# Patient Record
Sex: FEMALE | Race: WHITE | NOT HISPANIC OR LATINO | Employment: OTHER | URBAN - NONMETROPOLITAN AREA
[De-identification: names, ages, dates, MRNs, and addresses within clinical notes are randomized per-mention and may not be internally consistent; named-entity substitution may affect disease eponyms.]

---

## 2018-07-23 ENCOUNTER — TELEPHONE (OUTPATIENT)
Dept: GASTROENTEROLOGY | Facility: CLINIC | Age: 58
End: 2018-07-23

## 2018-07-23 ENCOUNTER — HOSPITAL ENCOUNTER (OUTPATIENT)
Dept: GENERAL RADIOLOGY | Facility: HOSPITAL | Age: 58
Discharge: HOME | End: 2018-07-23
Admitting: PHYSICIAN ASSISTANT

## 2018-07-23 ENCOUNTER — LAB (OUTPATIENT)
Dept: LAB | Facility: HOSPITAL | Age: 58
End: 2018-07-23

## 2018-07-23 ENCOUNTER — OFFICE VISIT (OUTPATIENT)
Dept: GASTROENTEROLOGY | Facility: CLINIC | Age: 58
End: 2018-07-23

## 2018-07-23 VITALS
HEART RATE: 72 BPM | HEIGHT: 62 IN | DIASTOLIC BLOOD PRESSURE: 100 MMHG | WEIGHT: 213.4 LBS | OXYGEN SATURATION: 98 % | SYSTOLIC BLOOD PRESSURE: 163 MMHG | BODY MASS INDEX: 39.27 KG/M2

## 2018-07-23 DIAGNOSIS — Z80.0 FAMILY HISTORY OF COLON CANCER: ICD-10-CM

## 2018-07-23 DIAGNOSIS — R19.5 LOOSE STOOLS: ICD-10-CM

## 2018-07-23 DIAGNOSIS — R19.00 ABDOMINAL SWELLING: ICD-10-CM

## 2018-07-23 DIAGNOSIS — R63.5 ABNORMAL WEIGHT GAIN: ICD-10-CM

## 2018-07-23 DIAGNOSIS — R12 HEARTBURN: Primary | ICD-10-CM

## 2018-07-23 LAB
ALBUMIN SERPL-MCNC: 4.5 G/DL (ref 3.5–5)
ALBUMIN/GLOB SERPL: 1.6 G/DL (ref 1.5–2.5)
ALP SERPL-CCNC: 90 U/L (ref 35–104)
ALT SERPL W P-5'-P-CCNC: 96 U/L (ref 10–36)
AMYLASE SERPL-CCNC: 59 U/L (ref 28–100)
ANION GAP SERPL CALCULATED.3IONS-SCNC: 5.6 MMOL/L (ref 3.6–11.2)
AST SERPL-CCNC: 48 U/L (ref 10–30)
BASOPHILS # BLD AUTO: 0.04 10*3/MM3 (ref 0–0.3)
BASOPHILS NFR BLD AUTO: 0.6 % (ref 0–2)
BILIRUB SERPL-MCNC: 0.8 MG/DL (ref 0.2–1.8)
BUN BLD-MCNC: 14 MG/DL (ref 7–21)
BUN/CREAT SERPL: 17.3 (ref 7–25)
CALCIUM SPEC-SCNC: 10.3 MG/DL (ref 7.7–10)
CHLORIDE SERPL-SCNC: 107 MMOL/L (ref 99–112)
CO2 SERPL-SCNC: 29.4 MMOL/L (ref 24.3–31.9)
CREAT BLD-MCNC: 0.81 MG/DL (ref 0.43–1.29)
CRP SERPL-MCNC: 1.75 MG/DL (ref 0–0.99)
DEPRECATED RDW RBC AUTO: 46.4 FL (ref 37–54)
EOSINOPHIL # BLD AUTO: 0.16 10*3/MM3 (ref 0–0.7)
EOSINOPHIL NFR BLD AUTO: 2.4 % (ref 0–5)
ERYTHROCYTE [DISTWIDTH] IN BLOOD BY AUTOMATED COUNT: 13.5 % (ref 11.5–14.5)
GFR SERPL CREATININE-BSD FRML MDRD: 72 ML/MIN/1.73
GLOBULIN UR ELPH-MCNC: 2.8 GM/DL
GLUCOSE BLD-MCNC: 112 MG/DL (ref 70–110)
HCT VFR BLD AUTO: 42.3 % (ref 37–47)
HGB BLD-MCNC: 13.8 G/DL (ref 12–16)
IMM GRANULOCYTES # BLD: 0.01 10*3/MM3 (ref 0–0.03)
IMM GRANULOCYTES NFR BLD: 0.1 % (ref 0–0.5)
LIPASE SERPL-CCNC: 76 U/L (ref 13–60)
LYMPHOCYTES # BLD AUTO: 2.02 10*3/MM3 (ref 1–3)
LYMPHOCYTES NFR BLD AUTO: 29.8 % (ref 21–51)
MCH RBC QN AUTO: 31.8 PG (ref 27–33)
MCHC RBC AUTO-ENTMCNC: 32.6 G/DL (ref 33–37)
MCV RBC AUTO: 97.5 FL (ref 80–94)
MONOCYTES # BLD AUTO: 0.72 10*3/MM3 (ref 0.1–0.9)
MONOCYTES NFR BLD AUTO: 10.6 % (ref 0–10)
NEUTROPHILS # BLD AUTO: 3.82 10*3/MM3 (ref 1.4–6.5)
NEUTROPHILS NFR BLD AUTO: 56.5 % (ref 30–70)
OSMOLALITY SERPL CALC.SUM OF ELEC: 284.3 MOSM/KG (ref 273–305)
PLATELET # BLD AUTO: 204 10*3/MM3 (ref 130–400)
PMV BLD AUTO: 11 FL (ref 6–10)
POTASSIUM BLD-SCNC: 4.3 MMOL/L (ref 3.5–5.3)
PROT SERPL-MCNC: 7.3 G/DL (ref 6–8)
RBC # BLD AUTO: 4.34 10*6/MM3 (ref 4.2–5.4)
SODIUM BLD-SCNC: 142 MMOL/L (ref 135–153)
T4 FREE SERPL-MCNC: 0.87 NG/DL (ref 0.89–1.76)
TSH SERPL DL<=0.05 MIU/L-ACNC: 3.96 MIU/ML (ref 0.55–4.78)
WBC NRBC COR # BLD: 6.77 10*3/MM3 (ref 4.5–12.5)

## 2018-07-23 PROCEDURE — 86140 C-REACTIVE PROTEIN: CPT

## 2018-07-23 PROCEDURE — 82150 ASSAY OF AMYLASE: CPT

## 2018-07-23 PROCEDURE — 83690 ASSAY OF LIPASE: CPT

## 2018-07-23 PROCEDURE — 74019 RADEX ABDOMEN 2 VIEWS: CPT

## 2018-07-23 PROCEDURE — 84439 ASSAY OF FREE THYROXINE: CPT

## 2018-07-23 PROCEDURE — 85025 COMPLETE CBC W/AUTO DIFF WBC: CPT

## 2018-07-23 PROCEDURE — 99204 OFFICE O/P NEW MOD 45 MIN: CPT | Performed by: PHYSICIAN ASSISTANT

## 2018-07-23 PROCEDURE — 36415 COLL VENOUS BLD VENIPUNCTURE: CPT

## 2018-07-23 PROCEDURE — 84443 ASSAY THYROID STIM HORMONE: CPT

## 2018-07-23 PROCEDURE — 80053 COMPREHEN METABOLIC PANEL: CPT

## 2018-07-23 PROCEDURE — 74019 RADEX ABDOMEN 2 VIEWS: CPT | Performed by: RADIOLOGY

## 2018-07-23 PROCEDURE — 86677 HELICOBACTER PYLORI ANTIBODY: CPT

## 2018-07-23 RX ORDER — IRBESARTAN 300 MG/1
300 TABLET ORAL NIGHTLY
COMMUNITY

## 2018-07-23 RX ORDER — PANTOPRAZOLE SODIUM 40 MG/1
40 TABLET, DELAYED RELEASE ORAL DAILY
Qty: 30 TABLET | Refills: 5 | Status: SHIPPED | OUTPATIENT
Start: 2018-07-23

## 2018-07-23 NOTE — PROGRESS NOTES
"Chief Complaint   Patient presents with   • Abdominal Pain   • Bloated   • Weight Gain     Madhu Lomeli is a 58 y.o. female who presents to the office today at the request of NUVIA Dior for evaluation of Abdominal Pain; Bloated; and Weight Gain.    HPI  She states that she has had GI complaints for 10-15 years and she has seen multiple providers without relief. Reports abdominal bloating and constant heartburn. She has also noticed weight gain. Had umbilical hernia in the past which was repaired and she was told that would help her swelling but it did not. If she sweeps the floor at home or picks up her grandson, the swelling in her abdomen will become very severe and she states that she looks \"9 months pregnant\" when it happens. Dr. Sanchez (she thinks, GI) told her that her bowels were \"pushed up against her liver and wrapped around her appendix.\" She states that these problems were \"fixed\" but she never had relief from her complaints. In the past, other providers have suspected that she is pregnant but testing was negative (not to her surprise). She was told by one provider that she \"was allergic to gluten\" but later was told by another that she was not. She stopped gluten for approx 6 months without relief of her complaints. Reports that she continues to gain weight although she eats small amounts. She has gained about 13 lbs in the past 3 months. Early satiety is present. Abdominal bloating is so severe that it makes it difficult for her to breathe. She drinks unsweet tea or decaf coffee and some pop. Diet is good in fiber per her report. She also takes daily fiber pills.     Heartburn is present every day and is severe. She is not currently taking any medications for it. She uses several medications over the counter such as tagamet which has not helped. BMs are described as occurring daily. She has up to 2 skip days between her stools at times. She does have straining at times and sometimes has " hard stools. Points to #6 on the [place] Stool Scale as her normal stool. No rectal bleeding or melena.     Her last colonoscopy was 5-6 years ago by Dr. Ga in [place] which was reported as normal. She has history of internal hemorrhoid removal. Her twin brother had colon cancer diagnosed at age 57 and sister also had colon cancer diagnosed at 53. She had EGD at the time of her colonoscopy which revealed gastric ulcers which were numerous per her report.     She recently had US thyroid and saw endocrinologist but he told her that she did not have a thyroid problem.     Review of Systems   Constitutional: Positive for fatigue and unexpected weight change. Negative for chills and fever.   HENT: Negative for congestion, sore throat and trouble swallowing.    Eyes: Positive for visual disturbance.   Respiratory: Positive for shortness of breath.    Cardiovascular: Positive for leg swelling. Negative for chest pain and palpitations.   Gastrointestinal: Positive for abdominal distention, abdominal pain and constipation. Negative for anal bleeding, blood in stool, diarrhea, nausea, rectal pain and vomiting.   Endocrine: Negative for cold intolerance and heat intolerance.   Genitourinary: Negative.    Musculoskeletal: Positive for arthralgias and myalgias. Negative for back pain.   Skin: Negative.    Allergic/Immunologic: Positive for environmental allergies and food allergies.   Neurological: Negative for dizziness and light-headedness.   Hematological: Does not bruise/bleed easily.   Psychiatric/Behavioral: Negative for sleep disturbance. The patient is nervous/anxious.      Past Medical History:   Diagnosis Date   • Hypertension    • Ulcer (CMS/HCC)      Past Surgical History:   Procedure Laterality Date   • APPENDECTOMY     • CHOLECYSTECTOMY     • COLONOSCOPY     • ENDOSCOPY     • HERNIA REPAIR     • OTHER SURGICAL HISTORY       Family History   Problem Relation Age of Onset   • Colon cancer Sister    • Colon  "cancer Brother    • Hypertension Other    • Heart disease Other      Social History   Substance Use Topics   • Smoking status: Never Smoker   • Smokeless tobacco: Never Used   • Alcohol use Yes       CURRENT MEDICATION:  •  irbesartan (AVAPRO) 300 MG tablet, Take 300 mg by mouth Every Night., Disp: , Rfl:     ALLERGIES:  Novocain [procaine] and Lactose intolerance (gi)    VISIT VITALS:  /100   Pulse 72   Ht 157.5 cm (62\")   Wt 96.8 kg (213 lb 6.4 oz)   SpO2 98%   BMI 39.03 kg/m²     Physical Exam   Constitutional: She is oriented to person, place, and time. She appears well-developed and well-nourished. No distress.   HENT:   Head: Normocephalic and atraumatic.   Nose: Nose normal.   Mouth/Throat: Oropharynx is clear and moist.   Eyes: Conjunctivae are normal. Right eye exhibits no discharge. Left eye exhibits no discharge. No scleral icterus.   Neck: Normal range of motion. No JVD present.   Cardiovascular: Normal rate, regular rhythm and normal heart sounds.  Exam reveals no gallop and no friction rub.    No murmur heard.  Pulmonary/Chest: Effort normal and breath sounds normal. No respiratory distress. She has no wheezes. She has no rales. She exhibits no tenderness.   Abdominal: Soft. Bowel sounds are normal. She exhibits distension. She exhibits no mass. There is tenderness (epigastric, mild).   Musculoskeletal: Normal range of motion. She exhibits no edema or deformity.   Neurological: She is alert and oriented to person, place, and time. Coordination normal.   Skin: Skin is warm and dry. No rash noted. She is not diaphoretic. No erythema.   Psychiatric: She has a normal mood and affect. Her behavior is normal. Judgment and thought content normal.   Vitals reviewed.      Assessment/Plan     1. Heartburn    2. Loose stools    3. Family history of colon cancer    4. Abdominal swelling    5. Abnormal weight gain       Orders Placed This Encounter   Procedures   • XR Abdomen Flat & Upright   • US " Abdomen Complete   • Amylase   • Comprehensive Metabolic Panel   • C-reactive Protein   • Lipase   • T4, Free   • TSH   • Helicobacter Pylori, IgA IgG IgM   • Follow Anesthesia Guidelines / Standing Orders   • CBC & Differential     ESOPHAGOGASTRODUODENOSCOPY WITH BIOPSY CPT CODE: 09738 (N/A), COLONOSCOPY CPT CODE: 92695 (N/A)  She will need an esophagogastroduodenoscopy and colonoscopy performed with IV general sedation. All of the risks, benefits and alternatives of these procedures have been discussed with her, all of her questions have been answered and she has elected to proceed. She should follow up in the office after these procedures to discuss the results and further recommendations can be made at that time.    New Medications Ordered This Visit   Medications   • polyethylene glycol (GoLYTELY) 236 g solution     Sig: Starting at 6pm the day before procedure, drink 8 ounces every 30 minutes until all gone or stools are clear. May add flavor packet.     Dispense:  4000 mL     Refill:  0   • pantoprazole (PROTONIX) 40 MG EC tablet     Sig: Take 1 tablet by mouth Daily.     Dispense:  30 tablet     Refill:  5     More recommendations will be made after aforementioned results have been reviewed. Consider SIBO treatment at next visit.    Patient's Body mass index is 39.03 kg/m². BMI is above normal parameters. Recommendations include: educational material.            Return for follow up after procedure to discuss results.      Electronically signed 7/23/2018 12:47 PM  Sheela Anand PA-C, [facility]

## 2018-07-24 ENCOUNTER — TELEPHONE (OUTPATIENT)
Dept: GASTROENTEROLOGY | Facility: CLINIC | Age: 58
End: 2018-07-24

## 2018-07-24 LAB
H PYLORI IGA SER IA-ACNC: 16.5 UNITS (ref 0–8.9)
H PYLORI IGG SER IA-ACNC: 0.47 INDEX VALUE (ref 0–0.79)
H PYLORI IGM SER-ACNC: <9 UNITS (ref 0–8.9)

## 2018-07-24 NOTE — TELEPHONE ENCOUNTER
Patient called and wanted to know results of her X-ray and labs she had done on 7-23-18.     Please and thank you

## 2018-07-25 NOTE — TELEPHONE ENCOUNTER
I faxed a cover sheet to PCP NUVIA Dior ([phone]) requesting a referral be sent to our office in regards to this patients abdominal pain and bloating.

## 2018-07-26 ENCOUNTER — HOSPITAL ENCOUNTER (OUTPATIENT)
Dept: ULTRASOUND IMAGING | Facility: HOSPITAL | Age: 58
Discharge: HOME | End: 2018-07-26
Admitting: PHYSICIAN ASSISTANT

## 2018-07-26 DIAGNOSIS — R19.00 ABDOMINAL SWELLING: ICD-10-CM

## 2018-07-26 DIAGNOSIS — R63.5 ABNORMAL WEIGHT GAIN: ICD-10-CM

## 2018-07-26 PROCEDURE — 76700 US EXAM ABDOM COMPLETE: CPT | Performed by: RADIOLOGY

## 2018-07-26 PROCEDURE — 76700 US EXAM ABDOM COMPLETE: CPT

## 2018-07-26 NOTE — TELEPHONE ENCOUNTER
Sheela, I was getting ready to call patient in-regards to her X-ray.Was her labs ok as well? I know she will ask.    Please and Thank you

## 2018-07-26 NOTE — TELEPHONE ENCOUNTER
Sorry, I looked over that. Her labs have some abnormal values- liver enzymes elevated, CRP (non-specific marker of inflammation) elevated, lipase mildly elevated (pancreas), her thyroid function is mildly decreased. US normal. Give her link to [employer] for future results if she would like. Thanks.

## 2018-07-27 PROBLEM — Z80.0 FAMILY HISTORY OF COLON CANCER: Status: ACTIVE | Noted: 2018-07-27

## 2018-07-27 PROBLEM — R19.5 LOOSE STOOLS: Status: ACTIVE | Noted: 2018-07-27

## 2018-07-27 PROBLEM — R12 HEARTBURN: Status: ACTIVE | Noted: 2018-07-27

## 2018-07-27 PROBLEM — R19.00 ABDOMINAL SWELLING: Status: ACTIVE | Noted: 2018-07-27

## 2018-08-07 ENCOUNTER — ANESTHESIA EVENT (OUTPATIENT)
Dept: PERIOP | Facility: HOSPITAL | Age: 58
End: 2018-08-07

## 2018-08-08 ENCOUNTER — ANESTHESIA (OUTPATIENT)
Dept: PERIOP | Facility: HOSPITAL | Age: 58
End: 2018-08-08

## 2018-08-08 ENCOUNTER — HOSPITAL ENCOUNTER (OUTPATIENT)
Facility: HOSPITAL | Age: 58
Setting detail: HOSPITAL OUTPATIENT SURGERY
Discharge: HOME | End: 2018-08-08
Attending: SURGERY | Admitting: SURGERY

## 2018-08-08 VITALS
SYSTOLIC BLOOD PRESSURE: 157 MMHG | RESPIRATION RATE: 20 BRPM | HEART RATE: 66 BPM | OXYGEN SATURATION: 96 % | DIASTOLIC BLOOD PRESSURE: 92 MMHG | TEMPERATURE: 98.5 F

## 2018-08-08 DIAGNOSIS — R12 HEARTBURN: ICD-10-CM

## 2018-08-08 DIAGNOSIS — R19.5 LOOSE STOOLS: ICD-10-CM

## 2018-08-08 DIAGNOSIS — Z80.0 FAMILY HISTORY OF COLON CANCER: ICD-10-CM

## 2018-08-08 DIAGNOSIS — R19.00 ABDOMINAL SWELLING: ICD-10-CM

## 2018-08-08 PROCEDURE — 25010000002 PROPOFOL 10 MG/ML EMULSION: Performed by: NURSE ANESTHETIST, CERTIFIED REGISTERED

## 2018-08-08 PROCEDURE — 43239 EGD BIOPSY SINGLE/MULTIPLE: CPT | Performed by: SURGERY

## 2018-08-08 PROCEDURE — 88305 TISSUE EXAM BY PATHOLOGIST: CPT | Performed by: SURGERY

## 2018-08-08 PROCEDURE — 25010000002 FENTANYL CITRATE (PF) 100 MCG/2ML SOLUTION: Performed by: NURSE ANESTHETIST, CERTIFIED REGISTERED

## 2018-08-08 PROCEDURE — 25010000002 PROPOFOL 1000 MG/ML EMULSION: Performed by: NURSE ANESTHETIST, CERTIFIED REGISTERED

## 2018-08-08 PROCEDURE — 45378 DIAGNOSTIC COLONOSCOPY: CPT | Performed by: SURGERY

## 2018-08-08 RX ORDER — LIDOCAINE HYDROCHLORIDE 20 MG/ML
INJECTION, SOLUTION INFILTRATION; PERINEURAL AS NEEDED
Status: DISCONTINUED | OUTPATIENT
Start: 2018-08-08 | End: 2018-08-08 | Stop reason: SURG

## 2018-08-08 RX ORDER — SODIUM CHLORIDE, SODIUM LACTATE, POTASSIUM CHLORIDE, CALCIUM CHLORIDE 600; 310; 30; 20 MG/100ML; MG/100ML; MG/100ML; MG/100ML
125 INJECTION, SOLUTION INTRAVENOUS CONTINUOUS
Status: DISCONTINUED | OUTPATIENT
Start: 2018-08-08 | End: 2018-08-08 | Stop reason: HOSPADM

## 2018-08-08 RX ORDER — SODIUM CHLORIDE 0.9 % (FLUSH) 0.9 %
1-10 SYRINGE (ML) INJECTION AS NEEDED
Status: DISCONTINUED | OUTPATIENT
Start: 2018-08-08 | End: 2018-08-08 | Stop reason: HOSPADM

## 2018-08-08 RX ORDER — IPRATROPIUM BROMIDE AND ALBUTEROL SULFATE 2.5; .5 MG/3ML; MG/3ML
3 SOLUTION RESPIRATORY (INHALATION) ONCE AS NEEDED
Status: DISCONTINUED | OUTPATIENT
Start: 2018-08-08 | End: 2018-08-08 | Stop reason: HOSPADM

## 2018-08-08 RX ORDER — FENTANYL CITRATE 50 UG/ML
50 INJECTION, SOLUTION INTRAMUSCULAR; INTRAVENOUS
Status: DISCONTINUED | OUTPATIENT
Start: 2018-08-08 | End: 2018-08-08 | Stop reason: HOSPADM

## 2018-08-08 RX ORDER — FENTANYL CITRATE 50 UG/ML
INJECTION, SOLUTION INTRAMUSCULAR; INTRAVENOUS AS NEEDED
Status: DISCONTINUED | OUTPATIENT
Start: 2018-08-08 | End: 2018-08-08 | Stop reason: SURG

## 2018-08-08 RX ORDER — ONDANSETRON 2 MG/ML
4 INJECTION INTRAMUSCULAR; INTRAVENOUS ONCE AS NEEDED
Status: DISCONTINUED | OUTPATIENT
Start: 2018-08-08 | End: 2018-08-08 | Stop reason: HOSPADM

## 2018-08-08 RX ORDER — PROPOFOL 10 MG/ML
VIAL (ML) INTRAVENOUS AS NEEDED
Status: DISCONTINUED | OUTPATIENT
Start: 2018-08-08 | End: 2018-08-08 | Stop reason: SURG

## 2018-08-08 RX ADMIN — SODIUM CHLORIDE, POTASSIUM CHLORIDE, SODIUM LACTATE AND CALCIUM CHLORIDE: 600; 310; 30; 20 INJECTION, SOLUTION INTRAVENOUS at 07:33

## 2018-08-08 RX ADMIN — PROPOFOL 150 MCG/KG/MIN: 10 INJECTION, EMULSION INTRAVENOUS at 07:36

## 2018-08-08 RX ADMIN — LIDOCAINE HYDROCHLORIDE 80 MG: 20 INJECTION, SOLUTION INFILTRATION; PERINEURAL at 07:36

## 2018-08-08 RX ADMIN — FENTANYL CITRATE 100 MCG: 50 INJECTION INTRAMUSCULAR; INTRAVENOUS at 07:33

## 2018-08-08 RX ADMIN — PROPOFOL 80 MG: 10 INJECTION, EMULSION INTRAVENOUS at 07:36

## 2018-08-08 NOTE — ANESTHESIA POSTPROCEDURE EVALUATION
Patient: Madhu Lomeli    Procedure Summary     Date:  08/08/18 Room / Location:  [facility] OR  /  [facility] OR    Anesthesia Start:  0733 Anesthesia Stop:  0756    Procedures:       ESOPHAGOGASTRODUODENOSCOPY WITH BIOPSY CPT CODE: 59742 (N/A Esophagus)      COLONOSCOPY CPT CODE: 63090 (N/A ) Diagnosis:       Heartburn      Family history of colon cancer      Loose stools      Abdominal swelling      (Heartburn [R12])      (Family history of colon cancer [Z80.0])      (Loose stools [R19.5])      (Abdominal swelling [R19.00])    Surgeon:  Fermin Singh MD Provider:  Frankie Subramanian MD    Anesthesia Type:  general ASA Status:  2          Anesthesia Type: general  Last vitals  BP   111/64 (08/08/18 0802)   Temp   98.5 °F (36.9 °C) (08/08/18 0757)   Pulse   73 (08/08/18 0802)   Resp   20 (08/08/18 0802)     SpO2   96 % (08/08/18 0802)     Post Anesthesia Care and Evaluation    Patient location during evaluation: PHASE II  Patient participation: complete - patient participated  Level of consciousness: awake and alert  Pain score: 1  Pain management: adequate  Airway patency: patent  Anesthetic complications: No anesthetic complications  PONV Status: controlled  Cardiovascular status: acceptable  Respiratory status: acceptable  Hydration status: acceptable

## 2018-08-08 NOTE — ANESTHESIA PREPROCEDURE EVALUATION
Anesthesia Evaluation     Patient summary reviewed and Nursing notes reviewed   no history of anesthetic complications:  NPO Solid Status: > 8 hours  NPO Liquid Status: > 8 hours           Airway   Mallampati: II  TM distance: >3 FB  Neck ROM: full  no difficulty expected  Dental - normal exam   (+) upper dentures    Pulmonary - negative pulmonary ROS and normal exam   (-) not a smoker  Cardiovascular - normal exam  Exercise tolerance: good (4-7 METS)    NYHA Classification: II    (+) hypertension,       Neuro/Psych- negative ROS  GI/Hepatic/Renal/Endo    (+) obesity,  GERD, PUD,      Musculoskeletal     Abdominal  - normal exam    Bowel sounds: normal.   Substance History - negative use     OB/GYN negative ob/gyn ROS         Other   (+) arthritis                   Anesthesia Plan    ASA 2     general     intravenous induction   Anesthetic plan and risks discussed with patient.  Use of blood products discussed with patient  Consented to blood products.

## 2018-08-08 NOTE — OP NOTE
ESOPHAGOGASTRODUODENOSCOPY, COLONOSCOPY  Procedure Note    Madhu Lomeli  8/8/2018    Pre-op Diagnosis:   Heartburn [R12]  Family history of colon cancer [Z80.0]  Loose stools [R19.5]  Abdominal swelling [R19.00]    Post-op Diagnosis:   Same with gastritis    Indications: see above    Procedure(s):  ESOPHAGOGASTRODUODENOSCOPY WITH BIOPSY CPT CODE: 39512  COLONOSCOPY CPT CODE: 48790    Surgeon(s):  Fermin Singh MD    Anesthesia: General    Staff:   Circulator: Radha Ayala RN  Endo Technician: Teofilo Escobedo    Findings: Gastritis, normal colon    Operative Procedure: The patient was taken operating suite and placed in left lateral decubitus position.  Bilateral sequential compression devices were in place and IV anesthesia was administered.  Timeout procedure was performed.  The endoscope was inserted into the oropharynx and the esophagus was intubated.  The scope was advanced to the third portion of the duodenum.  The scope was slowly withdrawn evaluating all mucosal areas.  The patient had mild to moderate gastritis without ulceration.  Biopsy forceps were used to sample the gastric antrum and gastric body.  Retroflexion was performed and there was no hiatal hernia or paraesophageal hernia.  The GE junction was within normal limits and the remainder the esophageal mucosa was within normal limits.  The scope was removed and colonoscopy was performed..  Digital rectal examination was within normal limits.  The colonoscope was inserted and advanced to the cecum as evidenced by the ileocecal valve and appendiceal orifice.  The prep was excellent.  Scope was slowly withdrawn evaluating mucosal surfaces.  No abnormalities were noted in the scope was removed and the patient was awakened from anesthesia and taken recovery.    Estimated Blood Loss: minimal    Specimens:   Gastric antrum                Drains: none    Grafts or Implants: none    Complications: none    Recommendations: colonoscopy in  10 years, continue PPI therapy    Fermin Singh MD     Date: 8/8/2018  Time: 8:01 AM

## 2018-08-08 NOTE — H&P (VIEW-ONLY)
"Chief Complaint   Patient presents with   • Abdominal Pain   • Bloated   • Weight Gain     Madhu Lomeli is a 58 y.o. female who presents to the office today at the request of NUVIA Dior for evaluation of Abdominal Pain; Bloated; and Weight Gain.    HPI  She states that she has had GI complaints for 10-15 years and she has seen multiple providers without relief. Reports abdominal bloating and constant heartburn. She has also noticed weight gain. Had umbilical hernia in the past which was repaired and she was told that would help her swelling but it did not. If she sweeps the floor at home or picks up her grandson, the swelling in her abdomen will become very severe and she states that she looks \"9 months pregnant\" when it happens. Dr. Sanchez (she thinks, GI) told her that her bowels were \"pushed up against her liver and wrapped around her appendix.\" She states that these problems were \"fixed\" but she never had relief from her complaints. In the past, other providers have suspected that she is pregnant but testing was negative (not to her surprise). She was told by one provider that she \"was allergic to gluten\" but later was told by another that she was not. She stopped gluten for approx 6 months without relief of her complaints. Reports that she continues to gain weight although she eats small amounts. She has gained about 13 lbs in the past 3 months. Early satiety is present. Abdominal bloating is so severe that it makes it difficult for her to breathe. She drinks unsweet tea or decaf coffee and some pop. Diet is good in fiber per her report. She also takes daily fiber pills.     Heartburn is present every day and is severe. She is not currently taking any medications for it. She uses several medications over the counter such as tagamet which has not helped. BMs are described as occurring daily. She has up to 2 skip days between her stools at times. She does have straining at times and sometimes has " hard stools. Points to #6 on the [place] Stool Scale as her normal stool. No rectal bleeding or melena.     Her last colonoscopy was 5-6 years ago by Dr. Ga in [place] which was reported as normal. She has history of internal hemorrhoid removal. Her twin brother had colon cancer diagnosed at age 57 and sister also had colon cancer diagnosed at 53. She had EGD at the time of her colonoscopy which revealed gastric ulcers which were numerous per her report.     She recently had US thyroid and saw endocrinologist but he told her that she did not have a thyroid problem.     Review of Systems   Constitutional: Positive for fatigue and unexpected weight change. Negative for chills and fever.   HENT: Negative for congestion, sore throat and trouble swallowing.    Eyes: Positive for visual disturbance.   Respiratory: Positive for shortness of breath.    Cardiovascular: Positive for leg swelling. Negative for chest pain and palpitations.   Gastrointestinal: Positive for abdominal distention, abdominal pain and constipation. Negative for anal bleeding, blood in stool, diarrhea, nausea, rectal pain and vomiting.   Endocrine: Negative for cold intolerance and heat intolerance.   Genitourinary: Negative.    Musculoskeletal: Positive for arthralgias and myalgias. Negative for back pain.   Skin: Negative.    Allergic/Immunologic: Positive for environmental allergies and food allergies.   Neurological: Negative for dizziness and light-headedness.   Hematological: Does not bruise/bleed easily.   Psychiatric/Behavioral: Negative for sleep disturbance. The patient is nervous/anxious.      Past Medical History:   Diagnosis Date   • Hypertension    • Ulcer (CMS/HCC)      Past Surgical History:   Procedure Laterality Date   • APPENDECTOMY     • CHOLECYSTECTOMY     • COLONOSCOPY     • ENDOSCOPY     • HERNIA REPAIR     • OTHER SURGICAL HISTORY       Family History   Problem Relation Age of Onset   • Colon cancer Sister    • Colon  "cancer Brother    • Hypertension Other    • Heart disease Other      Social History   Substance Use Topics   • Smoking status: Never Smoker   • Smokeless tobacco: Never Used   • Alcohol use Yes       CURRENT MEDICATION:  •  irbesartan (AVAPRO) 300 MG tablet, Take 300 mg by mouth Every Night., Disp: , Rfl:     ALLERGIES:  Novocain [procaine] and Lactose intolerance (gi)    VISIT VITALS:  /100   Pulse 72   Ht 157.5 cm (62\")   Wt 96.8 kg (213 lb 6.4 oz)   SpO2 98%   BMI 39.03 kg/m²     Physical Exam   Constitutional: She is oriented to person, place, and time. She appears well-developed and well-nourished. No distress.   HENT:   Head: Normocephalic and atraumatic.   Nose: Nose normal.   Mouth/Throat: Oropharynx is clear and moist.   Eyes: Conjunctivae are normal. Right eye exhibits no discharge. Left eye exhibits no discharge. No scleral icterus.   Neck: Normal range of motion. No JVD present.   Cardiovascular: Normal rate, regular rhythm and normal heart sounds.  Exam reveals no gallop and no friction rub.    No murmur heard.  Pulmonary/Chest: Effort normal and breath sounds normal. No respiratory distress. She has no wheezes. She has no rales. She exhibits no tenderness.   Abdominal: Soft. Bowel sounds are normal. She exhibits distension. She exhibits no mass. There is tenderness (epigastric, mild).   Musculoskeletal: Normal range of motion. She exhibits no edema or deformity.   Neurological: She is alert and oriented to person, place, and time. Coordination normal.   Skin: Skin is warm and dry. No rash noted. She is not diaphoretic. No erythema.   Psychiatric: She has a normal mood and affect. Her behavior is normal. Judgment and thought content normal.   Vitals reviewed.      Assessment/Plan     1. Heartburn    2. Loose stools    3. Family history of colon cancer    4. Abdominal swelling    5. Abnormal weight gain       Orders Placed This Encounter   Procedures   • XR Abdomen Flat & Upright   • US " Abdomen Complete   • Amylase   • Comprehensive Metabolic Panel   • C-reactive Protein   • Lipase   • T4, Free   • TSH   • Helicobacter Pylori, IgA IgG IgM   • Follow Anesthesia Guidelines / Standing Orders   • CBC & Differential     ESOPHAGOGASTRODUODENOSCOPY WITH BIOPSY CPT CODE: 17007 (N/A), COLONOSCOPY CPT CODE: 28692 (N/A)  She will need an esophagogastroduodenoscopy and colonoscopy performed with IV general sedation. All of the risks, benefits and alternatives of these procedures have been discussed with her, all of her questions have been answered and she has elected to proceed. She should follow up in the office after these procedures to discuss the results and further recommendations can be made at that time.    New Medications Ordered This Visit   Medications   • polyethylene glycol (GoLYTELY) 236 g solution     Sig: Starting at 6pm the day before procedure, drink 8 ounces every 30 minutes until all gone or stools are clear. May add flavor packet.     Dispense:  4000 mL     Refill:  0   • pantoprazole (PROTONIX) 40 MG EC tablet     Sig: Take 1 tablet by mouth Daily.     Dispense:  30 tablet     Refill:  5     More recommendations will be made after aforementioned results have been reviewed. Consider SIBO treatment at next visit.    Patient's Body mass index is 39.03 kg/m². BMI is above normal parameters. Recommendations include: educational material.            Return for follow up after procedure to discuss results.      Electronically signed 7/23/2018 12:47 PM  Sheela Anand PA-C, [facility]

## 2018-08-10 LAB
LAB AP CASE REPORT: NORMAL
PATH REPORT.FINAL DX SPEC: NORMAL

## 2018-08-23 ENCOUNTER — OFFICE VISIT (OUTPATIENT)
Dept: GASTROENTEROLOGY | Facility: CLINIC | Age: 58
End: 2018-08-23

## 2018-08-23 ENCOUNTER — LAB (OUTPATIENT)
Dept: LAB | Facility: HOSPITAL | Age: 58
End: 2018-08-23

## 2018-08-23 VITALS
BODY MASS INDEX: 38.9 KG/M2 | HEIGHT: 62 IN | OXYGEN SATURATION: 97 % | DIASTOLIC BLOOD PRESSURE: 87 MMHG | SYSTOLIC BLOOD PRESSURE: 132 MMHG | HEART RATE: 79 BPM | WEIGHT: 211.4 LBS

## 2018-08-23 DIAGNOSIS — R79.89 ELEVATED LIVER FUNCTION TESTS: ICD-10-CM

## 2018-08-23 DIAGNOSIS — K58.0 IRRITABLE BOWEL SYNDROME WITH DIARRHEA: ICD-10-CM

## 2018-08-23 DIAGNOSIS — R74.8 ABNORMAL LEVELS OF OTHER SERUM ENZYMES: ICD-10-CM

## 2018-08-23 DIAGNOSIS — R79.89 ELEVATED LIVER FUNCTION TESTS: Primary | ICD-10-CM

## 2018-08-23 DIAGNOSIS — R10.11 RIGHT UPPER QUADRANT ABDOMINAL PAIN: ICD-10-CM

## 2018-08-23 DIAGNOSIS — Z11.59 ENCOUNTER FOR SCREENING FOR OTHER VIRAL DISEASES (CODE): ICD-10-CM

## 2018-08-23 DIAGNOSIS — K63.89 SMALL INTESTINAL BACTERIAL OVERGROWTH: ICD-10-CM

## 2018-08-23 LAB
FERRITIN SERPL-MCNC: 170 NG/ML (ref 10–290.3)
GGT SERPL-CCNC: 48 U/L (ref 7–32)
HAV IGM SERPL QL IA: NORMAL
HBV CORE IGM SERPL QL IA: NORMAL
HBV SURFACE AB SER RIA-ACNC: NORMAL
HBV SURFACE AG SERPL QL IA: NORMAL
HCV AB SER DONR QL: NORMAL
IRON 24H UR-MRATE: 61 MCG/DL (ref 49–151)
IRON SATN MFR SERPL: 18 % (ref 15–50)
TIBC SERPL-MCNC: 333 MCG/DL (ref 241–421)

## 2018-08-23 PROCEDURE — 82977 ASSAY OF GGT: CPT

## 2018-08-23 PROCEDURE — 82728 ASSAY OF FERRITIN: CPT

## 2018-08-23 PROCEDURE — 36415 COLL VENOUS BLD VENIPUNCTURE: CPT

## 2018-08-23 PROCEDURE — 83516 IMMUNOASSAY NONANTIBODY: CPT

## 2018-08-23 PROCEDURE — 82104 ALPHA-1-ANTITRYPSIN PHENO: CPT

## 2018-08-23 PROCEDURE — 82103 ALPHA-1-ANTITRYPSIN TOTAL: CPT

## 2018-08-23 PROCEDURE — 86255 FLUORESCENT ANTIBODY SCREEN: CPT

## 2018-08-23 PROCEDURE — 99214 OFFICE O/P EST MOD 30 MIN: CPT | Performed by: PHYSICIAN ASSISTANT

## 2018-08-23 PROCEDURE — 82784 ASSAY IGA/IGD/IGG/IGM EACH: CPT

## 2018-08-23 PROCEDURE — 86706 HEP B SURFACE ANTIBODY: CPT

## 2018-08-23 PROCEDURE — 86038 ANTINUCLEAR ANTIBODIES: CPT

## 2018-08-23 PROCEDURE — 80074 ACUTE HEPATITIS PANEL: CPT

## 2018-08-23 PROCEDURE — 86708 HEPATITIS A ANTIBODY: CPT

## 2018-08-23 PROCEDURE — 82390 ASSAY OF CERULOPLASMIN: CPT

## 2018-08-23 PROCEDURE — 83550 IRON BINDING TEST: CPT

## 2018-08-23 PROCEDURE — 83540 ASSAY OF IRON: CPT

## 2018-08-23 RX ORDER — NITAZOXANIDE 500 MG/1
500 TABLET ORAL 2 TIMES DAILY WITH MEALS
Qty: 20 TABLET | Refills: 0 | Status: SHIPPED | OUTPATIENT
Start: 2018-08-23 | End: 2018-08-23

## 2018-08-23 NOTE — PROGRESS NOTES
"Chief Complaint   Patient presents with   • procedure follow up       Madhu Lomeli is a 58 y.o. female who presents to the office today for follow up appointment regarding recent procedure and abdominal bloating.    HPI  She states that her symptoms are still the same. She has never been told that she has elevated liver enzymes. She drinks alcohol about every other day. She has a couple of shots when she drinks and has been doing this for approx the past 1 year. She has been checked for hepatitis in the past which has been negative per her report. Last episode of very severe bloating was 2 days ago and was her only episode since colonoscopy. She states that she has had GI complaints for 10-15 years and she has seen multiple providers without relief. Reports abdominal bloating and constant heartburn. She has also noticed weight gain. Had umbilical hernia in the past which was repaired and she was told that would help her swelling but it did not. If she sweeps the floor at home or picks up her grandson, the swelling in her abdomen will become very severe and she states that she looks \"9 months pregnant\" when it happens. Dr. Sanchez (she thinks, GI) told her that her bowels were \"pushed up against her liver and wrapped around her appendix.\" She states that these problems were \"fixed\" but she never had relief from her complaints. In the past, other providers have suspected that she is pregnant but testing was negative (not to her surprise). She was told by one provider that she \"was allergic to gluten\" but later was told by another that she was not. She stopped gluten for approx 6 months without relief of her complaints. Reports that she continues to gain weight although she eats small amounts. She has gained about 13 lbs in the past 3 months. Early satiety is present. Abdominal bloating is so severe that it makes it difficult for her to breathe. She drinks unsweet tea or decaf coffee and some pop. Diet is good in " "fiber per her report. She also takes daily fiber pills. States that she does not recall every having her \"tubes tied.\"     Heartburn was previously present every day and was severe. Protonix 40 mg once daily has been taken since last office visit and does seem to be helping. She has used several medications over the counter such as tagamet which has not helped. BMs are described as occurring daily. She has up to 2 skip days between her stools at times. She does have straining at times and sometimes has hard stools. Points to #6 on the [place] Stool Scale as her normal stool. No rectal bleeding or melena.      Her previous colonoscopy was 5-6 years ago by Dr. Ga in [place] which was reported as normal. She has history of internal hemorrhoid removal. Her twin brother had colon cancer diagnosed at age 57 and sister also had colon cancer diagnosed at 53. She had EGD at the time of her colonoscopy which revealed gastric ulcers which were numerous per her report. She recently had US thyroid and saw endocrinologist but he told her that she did not have a thyroid problem. He did not take any new labs at that visit. She wonders if she can have \"thyroid antibodies\" tested for abnormality.     She had EGD and colonoscopy completed by Dr. Singh on 8/8/2018 which revealed moderate gastritis and normal colon. Pathology showed mild chronic gastritis and negative H pylori.     Abdominal film 7/23/2018:  FINDINGS:   Bowel gas pattern is nonobstructive. Minimal stool throughout the colon.  Cholecystectomy clips right upper quadrant. Bilateral tubal ligation  clips lower pelvis. No radiographic evidence of free air. The bony  structures appear within normal limits.   IMPRESSION:  No acute findings.    US abd 7/26/2018:  IMPRESSION:  Negative abdominal ultrasound.     Labs 7/23/2018:  Amylase normal  Lipase mildly elevated at 76  CRP elev at 1.75  TSH normal at 3.963  Free T4 decr at 0.87  H pylori negative except IgA  CBC " normal    Glucose 70 - 110 mg/dL 112     BUN 7 - 21 mg/dL 14    Creatinine 0.43 - 1.29 mg/dL 0.81    Sodium 135 - 153 mmol/L 142    Potassium 3.5 - 5.3 mmol/L 4.3    Chloride 99 - 112 mmol/L 107    CO2 24.3 - 31.9 mmol/L 29.4    Calcium 7.7 - 10.0 mg/dL 10.3     Total Protein 6.0 - 8.0 g/dL 7.3    Albumin 3.50 - 5.00 g/dL 4.50    ALT (SGPT) 10 - 36 U/L 96     AST (SGOT) 10 - 30 U/L 48     Alkaline Phosphatase 35 - 104 U/L 90    Comment: Note New Reference Ranges   Total Bilirubin 0.2 - 1.8 mg/dL 0.8    eGFR Non African Amer >60 mL/min/1.73 72    Globulin gm/dL 2.8    A/G Ratio 1.5 - 2.5 g/dL 1.6    BUN/Creatinine Ratio 7.0 - 25.0 17.3    Anion Gap 3.6 - 11.2 mmol/L 5.6      Review of Systems   Constitutional: Positive for fatigue.   HENT: Negative for trouble swallowing.    Eyes: Negative for photophobia and visual disturbance.   Respiratory: Negative for shortness of breath.    Cardiovascular: Negative for chest pain.   Gastrointestinal: Positive for abdominal distention and abdominal pain. Negative for anal bleeding, blood in stool, constipation, diarrhea, nausea, rectal pain and vomiting.   Endocrine: Negative for cold intolerance and heat intolerance.   Genitourinary: Negative.    Musculoskeletal: Positive for arthralgias, back pain and myalgias.   Skin: Negative for rash and wound.   Allergic/Immunologic: Positive for environmental allergies.   Neurological: Negative for dizziness and light-headedness.   Hematological: Does not bruise/bleed easily.   Psychiatric/Behavioral: The patient is nervous/anxious.      Past Medical History:   Diagnosis Date   • Arthritis    • GERD (gastroesophageal reflux disease)    • Hypertension    • Ulcer (CMS/HCC)      Past Surgical History:   Procedure Laterality Date   • APPENDECTOMY     • CHOLECYSTECTOMY     • COLONOSCOPY     • COLONOSCOPY N/A 8/8/2018    Procedure: COLONOSCOPY CPT CODE: 46547;  Surgeon: Fermin Singh MD;  Location: [facility];  Service: Gastroenterology  "  • ENDOSCOPY     • ENDOSCOPY N/A 8/8/2018    Procedure: ESOPHAGOGASTRODUODENOSCOPY WITH BIOPSY CPT CODE: 14998;  Surgeon: Fermin Singh MD;  Location: [facility];  Service: Gastroenterology   • HEMORRHOID BANDING     • HERNIA REPAIR     • OTHER SURGICAL HISTORY       Family History   Problem Relation Age of Onset   • Colon cancer Sister    • Colon cancer Brother    • Hypertension Other    • Heart disease Other      Social History   Substance Use Topics   • Smoking status: Never Smoker   • Smokeless tobacco: Never Used   • Alcohol use Yes      Comment: occasional       CURRENT MEDICATION:  •  irbesartan (AVAPRO) 300 MG tablet, Take 300 mg by mouth Every Night., Disp: , Rfl:   •  pantoprazole (PROTONIX) 40 MG EC tablet, Take 1 tablet by mouth Daily., Disp: 30 tablet, Rfl: 5    ALLERGIES:  Novocain [procaine] and Lactose intolerance (gi)    VISIT VITALS:  /87   Pulse 79   Ht 157.5 cm (62\")   Wt 95.9 kg (211 lb 6.4 oz)   SpO2 97%   BMI 38.67 kg/m²     Physical Exam   Constitutional: She is oriented to person, place, and time. She appears well-developed and well-nourished. No distress.   HENT:   Head: Normocephalic and atraumatic.   Nose: Nose normal.   Mouth/Throat: Oropharynx is clear and moist.   Eyes: Conjunctivae are normal. Right eye exhibits no discharge. Left eye exhibits no discharge. No scleral icterus.   Neck: Normal range of motion. No JVD present.   Cardiovascular: Normal rate, regular rhythm and normal heart sounds.  Exam reveals no gallop and no friction rub.    No murmur heard.  Pulmonary/Chest: Effort normal and breath sounds normal. No respiratory distress. She has no wheezes. She has no rales. She exhibits no tenderness.   Abdominal: Soft. Bowel sounds are normal. She exhibits distension. She exhibits no mass. There is tenderness (epigastric, mild).   Musculoskeletal: Normal range of motion. She exhibits no edema or deformity.   Neurological: She is alert and oriented to person, " place, and time. Coordination normal.   Skin: Skin is warm and dry. No rash noted. She is not diaphoretic. No erythema.   Psychiatric: She has a normal mood and affect. Her behavior is normal. Judgment and thought content normal.   Vitals reviewed.      Assessment/Plan     1. Elevated liver function tests    2. Abnormal levels of other serum enzymes     3. Encounter for screening for other viral diseases (CODE)     4. Right upper quadrant abdominal pain     5. Small intestinal bacterial overgrowth    6. Irritable bowel syndrome with diarrhea      Orders Placed This Encounter   Procedures   • Gamma GT   • Alpha - 1 - Antitrypsin Phenotype   • Anti-Smooth Muscle Antibody Titer   • Ceruloplasmin   • Celiac Comprehensive Panel   • Ferritin   • Hepatitis A Antibody, Total   • Hepatitis B Surface Antibody   • Hepatitis Panel, Acute   • IgG, IgA, IgM   • Iron Profile   • Mitochondrial Antibodies, M2   • Nuclear Antigen Antibody, IFA     New Medications Ordered This Visit   Medications   • rifaximin (XIFAXAN) 550 MG tablet     Sig: Take 1 tablet by mouth 3 (Three) Times a Day.     Dispense:  42 tablet     Refill:  0     Abnormal AST and ALT were noted on recent labs. She will be tested for possible etiologies including Alpha 1 antitrypsin deficiency, autoimmune hepatitis, primary biliary cirrhosis, Yovani's disease, hereditary hemochromatosis, celiac diease, acute hepatitis A, B and C. Immunity for Hep A and B will be checked as well and vaccinations will be recommended if needed.Ultrasound of abdomen already completed and normal. Abdominal film showed hepatomegaly on my review.     For non-specific GI complaints including severe bloating and fluctuation in bowel habits, I would like her to have therapy for suspected SIBO. First, I recommended Alinia but it was not covered by her insurance. We will attempt for pre-authorization of Xifaxan 550 mg TID for 14 days.             Return in about 1 month (around 9/23/2018) for  recheck abdominal pain.        Electronically signed 8/23/2018 4:48 PM  Sheela Anand PA-C, [facility]- [facility]

## 2018-08-24 ENCOUNTER — TELEPHONE (OUTPATIENT)
Dept: GASTROENTEROLOGY | Facility: CLINIC | Age: 58
End: 2018-08-24

## 2018-08-25 LAB
CERULOPLASMIN SERPL-MCNC: 29 MG/DL (ref 19–39)
DEPRECATED MITOCHONDRIA M2 IGG SER-ACNC: <20 UNITS (ref 0–20)
HAV AB SER QL IA: POSITIVE
IGA SERPL-MCNC: 204 MG/DL (ref 87–352)
IGG SERPL-MCNC: 851 MG/DL (ref 700–1600)
IGM SERPL-MCNC: 65 MG/DL (ref 26–217)

## 2018-08-27 LAB
ACTIN IGG SERPL-ACNC: 19 UNITS (ref 0–19)
ANA SER QL IA: NEGATIVE
ENDOMYSIUM IGA SER QL: NEGATIVE
GLIADIN PEPTIDE IGA SER-ACNC: 5 UNITS (ref 0–19)
GLIADIN PEPTIDE IGG SER-ACNC: 2 UNITS (ref 0–19)
IGA SERPL-MCNC: 202 MG/DL (ref 87–352)
TTG IGA SER-ACNC: <2 U/ML (ref 0–3)
TTG IGG SER-ACNC: <2 U/ML (ref 0–5)

## 2018-08-28 LAB
A1AT SERPL-MCNC: 141 MG/DL (ref 90–200)
PHENOTYPE: NORMAL

## 2018-08-31 ENCOUNTER — TELEPHONE (OUTPATIENT)
Dept: GASTROENTEROLOGY | Facility: CLINIC | Age: 58
End: 2018-08-31

## 2018-09-20 ENCOUNTER — OFFICE VISIT (OUTPATIENT)
Dept: GASTROENTEROLOGY | Facility: CLINIC | Age: 58
End: 2018-09-20

## 2018-09-20 VITALS
BODY MASS INDEX: 38.42 KG/M2 | HEIGHT: 62 IN | OXYGEN SATURATION: 97 % | WEIGHT: 208.8 LBS | HEART RATE: 68 BPM | SYSTOLIC BLOOD PRESSURE: 132 MMHG | DIASTOLIC BLOOD PRESSURE: 84 MMHG

## 2018-09-20 DIAGNOSIS — B37.31 VAGINAL YEAST INFECTION: ICD-10-CM

## 2018-09-20 DIAGNOSIS — K63.89 SMALL INTESTINAL BACTERIAL OVERGROWTH: Primary | ICD-10-CM

## 2018-09-20 DIAGNOSIS — K76.0 FATTY LIVER: ICD-10-CM

## 2018-09-20 DIAGNOSIS — Z23 NEED FOR IMMUNIZATION AGAINST VIRAL HEPATITIS: ICD-10-CM

## 2018-09-20 PROCEDURE — 99214 OFFICE O/P EST MOD 30 MIN: CPT | Performed by: PHYSICIAN ASSISTANT

## 2018-09-20 RX ORDER — NITAZOXANIDE 500 MG/1
500 TABLET ORAL 2 TIMES DAILY WITH MEALS
Qty: 20 TABLET | Refills: 0 | COMMUNITY
Start: 2018-09-20 | End: 2018-09-30

## 2018-09-20 RX ORDER — FLUCONAZOLE 150 MG/1
150 TABLET ORAL ONCE
Qty: 1 TABLET | Refills: 0 | Status: SHIPPED | OUTPATIENT
Start: 2018-09-20 | End: 2018-09-20

## 2018-09-20 NOTE — PROGRESS NOTES
"Chief Complaint   Patient presents with   • Bloated     1 month follow up        Madhu Lomeli is a 58 y.o. female who presents to the office today for follow up appointment regarding bloating.     HPI  Today, she states that there has only been mild improvement in bloating complaint. Since her last office visit, she took Xifaxan 550 mg TID for 14 days. She developed a yeast infection from this medication and reports itching which is very severe after completing. After the therapy, she noticed no improvement at all with stools or with bloating.  She states that she has no prior history of elevated cholesterol or DM2. Her brother had cirrhosis but was an alcoholic. She drinks alcohol about every other day. She has a couple of shots when she drinks and has been doing this for approx the past 1 year. She has been checked for hepatitis in the past which has been negative per her report.     She states that she has had GI complaints for 10-15 years and she has seen multiple providers without relief. Reports abdominal bloating and constant heartburn. She has also noticed weight gain. Had umbilical hernia in the past which was repaired and she was told that would help her swelling but it did not. If she sweeps the floor at home or picks up her grandson, the swelling in her abdomen will become very severe and she states that she looks \"9 months pregnant\" when it happens. Dr. Sanchez (she thinks, GI) told her that her bowels were \"pushed up against her liver and wrapped around her appendix.\" She states that these problems were \"fixed\" but she never had relief from her complaints. In the past, other providers have suspected that she is pregnant but testing was negative (not to her surprise). She was told by one provider that she \"was allergic to gluten\" but later was told by another that she was not. She stopped gluten for approx 6 months without relief of her complaints. Reports that she continues to gain weight although " "she eats small amounts. She has gained about 13 lbs in the past 3 months. Early satiety is present. Abdominal bloating is so severe that it makes it difficult for her to breathe. She drinks unsweet tea or decaf coffee and some pop. Diet is good in fiber per her report. She also takes daily fiber pills. States that she does not recall every having her \"tubes tied.\"    She had EGD and colonoscopy completed by Dr. Singh on 8/8/2018 which revealed moderate gastritis and normal colon. Pathology showed mild chronic gastritis and negative H pylori. Her previous colonoscopy was 5-6 years ago by Dr. Ga in [place] which was reported as normal. She has history of internal hemorrhoid removal. Her twin brother had colon cancer diagnosed at age 57 and sister also had colon cancer diagnosed at 53. She had EGD at the time of her colonoscopy which revealed gastric ulcers which were numerous per her report. She recently had US thyroid and saw endocrinologist but he told her that she did not have a thyroid problem. He did not take any new labs at that visit. She wonders if she can have \"thyroid antibodies\" tested for abnormality.     Abdominal film 7/23/2018 normal. (on my review: Hepatomegaly. Normal stool volume.)     US abd 7/26/2018 normal     Labs 7/23/2018:  Amylase normal  Lipase mildly elevated at 76  CRP elev at 1.75  TSH normal at 3.963  Free T4 decr at 0.87  H pylori negative except IgA  CBC normal  CMP elev AST 48, ALT 96, normal AP, normal Bili     Labs 8/23/2018:  Ferritin normal  Iron profile normal, borderline iron deficiency at 61  [id number] normal  Anti-smooth muscle antibody normal  Anti-nuclear antibody normal  Anti-mitochondrial antibody normal  Ceruloplasmin normal  Celiac panel negative  Hep B surf Ab negative  Hep A total Ab positive  Acute hepatitis panel negative  IgG, IgA, IgM normal  PT/INR normal  GGT elev 48    Review of Systems   Constitutional: Negative for fatigue.   HENT: Negative " for trouble swallowing.    Eyes: Positive for redness.   Respiratory: Negative.  Negative for choking, chest tightness and shortness of breath.    Cardiovascular: Negative.    Gastrointestinal: Positive for abdominal pain and constipation. Negative for diarrhea, nausea and vomiting.   Endocrine: Negative for heat intolerance.   Genitourinary: Negative for frequency.   Musculoskeletal: Negative for back pain.   Skin: Negative for color change and rash.   Allergic/Immunologic: Negative for food allergies.   Neurological: Positive for headaches.   Hematological: Does not bruise/bleed easily.   Psychiatric/Behavioral: The patient is not nervous/anxious.        Past Medical History:   Diagnosis Date   • Arthritis    • GERD (gastroesophageal reflux disease)    • Hypertension    • Ulcer (CMS/HCC)      Past Surgical History:   Procedure Laterality Date   • APPENDECTOMY     • CHOLECYSTECTOMY     • COLONOSCOPY     • COLONOSCOPY N/A 8/8/2018    Procedure: COLONOSCOPY CPT CODE: 09387;  Surgeon: Fermin Singh MD;  Location: [facility];  Service: Gastroenterology   • ENDOSCOPY     • ENDOSCOPY N/A 8/8/2018    Procedure: ESOPHAGOGASTRODUODENOSCOPY WITH BIOPSY CPT CODE: 68498;  Surgeon: Fermin Singh MD;  Location: [facility];  Service: Gastroenterology   • HEMORRHOID BANDING     • HERNIA REPAIR     • OTHER SURGICAL HISTORY       Family History   Problem Relation Age of Onset   • Colon cancer Sister    • Colon cancer Brother    • Hypertension Other    • Heart disease Other      Social History   Substance Use Topics   • Smoking status: Never Smoker   • Smokeless tobacco: Never Used   • Alcohol use Yes      Comment: occasional       CURRENT MEDICATION:  •  irbesartan (AVAPRO) 300 MG tablet, Take 300 mg by mouth Every Night., Disp: , Rfl:   •  pantoprazole (PROTONIX) 40 MG EC tablet, Take 1 tablet by mouth Daily., Disp: 30 tablet, Rfl: 5    ALLERGIES:  Novocain [procaine] and Lactose intolerance (gi)    VISIT VITALS:  BP  "132/84 (BP Location: Right arm, Patient Position: Sitting, Cuff Size: Adult)   Pulse 68   Ht 157.5 cm (62\")   Wt 94.7 kg (208 lb 12.8 oz)   SpO2 97%   BMI 38.19 kg/m²     Physical Exam   Constitutional: She is oriented to person, place, and time. She appears well-developed and well-nourished. No distress.   HENT:   Head: Normocephalic and atraumatic.   Nose: Nose normal.   Mouth/Throat: Oropharynx is clear and moist.   Eyes: Conjunctivae are normal. Right eye exhibits no discharge. Left eye exhibits no discharge. No scleral icterus.   Neck: Normal range of motion. No JVD present.   Cardiovascular: Normal rate, regular rhythm and normal heart sounds.  Exam reveals no gallop and no friction rub.    No murmur heard.  Pulmonary/Chest: Effort normal and breath sounds normal. No respiratory distress. She has no wheezes. She has no rales. She exhibits no tenderness.   Abdominal: Soft. Bowel sounds are normal. She exhibits distension. She exhibits no mass. There is tenderness (epigastric, mild).   Musculoskeletal: Normal range of motion. She exhibits no edema or deformity.   Neurological: She is alert and oriented to person, place, and time. Coordination normal.   Skin: Skin is warm and dry. No rash noted. She is not diaphoretic. No erythema.   Psychiatric: She has a normal mood and affect. Her behavior is normal. Judgment and thought content normal.   Vitals reviewed.      Assessment/Plan     1. Small intestinal bacterial overgrowth    2. Vaginal yeast infection    3. Fatty liver    4. Need for immunization against viral hepatitis      Orders Placed This Encounter   Procedures   • Hepatitis B Vaccine Adult IM     New Medications Ordered This Visit   Medications   • fluconazole (DIFLUCAN) 150 MG tablet     Sig: Take 1 tablet by mouth 1 (One) Time for 1 dose.     Dispense:  1 tablet     Refill:  0   • nitazoxanide (ALINIA) 500 MG tablet     Sig: Take 1 tablet by mouth 2 (Two) Times a Day With Meals for 10 days.     " Dispense:  20 tablet     Refill:  0     Order Specific Question:   Quantity     Answer:   10     Due to suspected SIBO and no improvement with Xifaxan, I have asked her to take Alinia as directed and samples were given.    Liver enzymes are abnormal. She had complete serological liver evaluation plus US abdomen. I believe that fatty liver is the cause of her abnormal labs and I have asked her to work on getting to a normal weight for her height. Series of immunizations for Hepatitis B should be obtained and have been ordered today. She is already immune to Hep A. Immunity is important to prevent further insult to her liver. Information regarding locations that these can be performed has been expressed.    Diflucan will be taken for treatment of vaginal yeast infection related to previous medication.         Return in about 1 month (around 10/20/2018) for recheck bloating.      Electronically signed 9/26/2018 4:30 PM  Sheela Anand PA-C, [facility]- [facility]

## 2018-12-03 ENCOUNTER — OFFICE VISIT (OUTPATIENT)
Dept: GASTROENTEROLOGY | Facility: CLINIC | Age: 58
End: 2018-12-03

## 2018-12-03 VITALS
BODY MASS INDEX: 39.01 KG/M2 | OXYGEN SATURATION: 96 % | DIASTOLIC BLOOD PRESSURE: 87 MMHG | SYSTOLIC BLOOD PRESSURE: 132 MMHG | WEIGHT: 212 LBS | HEIGHT: 62 IN | HEART RATE: 72 BPM

## 2018-12-03 DIAGNOSIS — R12 HEARTBURN: ICD-10-CM

## 2018-12-03 DIAGNOSIS — R14.0 BLOATING: Primary | ICD-10-CM

## 2018-12-03 PROCEDURE — 99213 OFFICE O/P EST LOW 20 MIN: CPT | Performed by: PHYSICIAN ASSISTANT

## 2018-12-03 NOTE — PROGRESS NOTES
: 1960    Chief Complaint   Patient presents with   • Small intestinal bacterial overgrowth       Madhu Lomeli is a 58 y.o. female who presents to the office today as a follow up appointment regarding Small intestinal bacterial overgrowth.    History of Present Illness:  She is still having severe bloating which is worse with any physical activity. She took Alinia for treatment of SIBO and had relief from her problem while taking it and for a few days after completion of therapy. She has never had allergy testing. Took Xifaxan in the past without any relief. Has history of fatty liver. Previous testing did not show etiology of her complaint. No known relation with food and symptoms. Heartburn is much better with Protonix 40 mg once daily.     Review of Systems   Constitutional: Positive for fatigue. Negative for chills and fever.   HENT: Negative for trouble swallowing.    Eyes: Positive for redness.   Respiratory: Negative.  Negative for choking, chest tightness and shortness of breath.    Cardiovascular: Negative.    Gastrointestinal: Positive for abdominal distention, abdominal pain and constipation. Negative for anal bleeding, blood in stool, diarrhea, nausea and vomiting.   Endocrine: Negative for heat intolerance.   Genitourinary: Negative for frequency.   Musculoskeletal: Negative for back pain.   Skin: Negative for color change, pallor and rash.   Allergic/Immunologic: Negative for food allergies.   Neurological: Positive for headaches.   Hematological: Does not bruise/bleed easily.   Psychiatric/Behavioral: The patient is not nervous/anxious.        Past Medical History:   Diagnosis Date   • Arthritis    • GERD (gastroesophageal reflux disease)    • Hypertension    • Ulcer        Past Surgical History:   Procedure Laterality Date   • APPENDECTOMY     • CHOLECYSTECTOMY     • COLONOSCOPY     • ENDOSCOPY     • HEMORRHOID BANDING     • HERNIA REPAIR     • OTHER SURGICAL HISTORY         Family  "History   Problem Relation Age of Onset   • Colon cancer Sister    • Colon cancer Brother    • Hypertension Other    • Heart disease Other        Social History     Socioeconomic History   • Marital status:      Spouse name: Not on file   • Number of children: Not on file   • Years of education: Not on file   • Highest education level: Not on file   Tobacco Use   • Smoking status: Never Smoker   • Smokeless tobacco: Never Used   Substance and Sexual Activity   • Alcohol use: Yes     Comment: occasional   • Drug use: No   • Sexual activity: Defer       Current Outpatient Medications:   •  irbesartan (AVAPRO) 300 MG tablet, Take 300 mg by mouth Every Night., Disp: , Rfl:   •  pantoprazole (PROTONIX) 40 MG EC tablet, Take 1 tablet by mouth Daily., Disp: 30 tablet, Rfl: 5    Allergies:   Novocain [procaine] and Lactose intolerance (gi)    Vitals:  /87 (BP Location: Right arm, Patient Position: Sitting, Cuff Size: Adult)   Pulse 72   Ht 157.5 cm (62\")   Wt 96.2 kg (212 lb)   SpO2 96%   BMI 38.78 kg/m²     Physical Exam   Constitutional: She is oriented to person, place, and time. She appears well-developed and well-nourished. No distress.   HENT:   Head: Normocephalic and atraumatic.   Nose: Nose normal.   Mouth/Throat: Oropharynx is clear and moist.   Eyes: Conjunctivae are normal. Right eye exhibits no discharge. Left eye exhibits no discharge. No scleral icterus.   Neck: Normal range of motion. No JVD present.   Cardiovascular: Normal rate, regular rhythm and normal heart sounds. Exam reveals no gallop and no friction rub.   No murmur heard.  Pulmonary/Chest: Effort normal and breath sounds normal. No respiratory distress. She has no wheezes. She has no rales. She exhibits no tenderness.   Abdominal: Soft. Bowel sounds are normal. She exhibits distension. She exhibits no mass. There is tenderness (epigastric, mild).   Musculoskeletal: Normal range of motion. She exhibits no edema or deformity. "   Neurological: She is alert and oriented to person, place, and time. Coordination normal.   Skin: Skin is warm and dry. No rash noted. She is not diaphoretic. No erythema.   Psychiatric: She has a normal mood and affect. Her behavior is normal. Judgment and thought content normal.   Vitals reviewed.      Assessment/Plan:  1. Bloating    2. Heartburn      Orders Placed This Encounter   Procedures   • Ambulatory Referral to Allergy     Allergy referral has been placed to look for etiology of bloating complaint to see if it is related to foods that she is eating or even environmental allergen. Has history of increased CRP. Felt better after SIBO treatment but symptoms returned. She may need another treatment at next appt. Continue taking Protonix for relief of heartburn. Start daily probiotic.         Return in about 6 weeks (around 1/14/2019) for recheck bloating.      Electronically signed 12/3/2018 4:36 PM  Sheela Anand PA-C, [facility]

## 2019-10-14 NOTE — TELEPHONE ENCOUNTER
I left a message letting patient know that her Xifaxan was approved. I got it approved through cover my meds.       
Adequate: hears normal conversation without difficulty

## (undated) DEVICE — THE BITE BLOCK MAXI, LATEX FREE STRAP IS USED TO PROTECT THE ENDOSCOPE INSERTION TUBE FROM BEING BITTEN BY THE PATIENT.

## (undated) DEVICE — CONN Y IRR DISP 1P/U

## (undated) DEVICE — Device: Brand: DEFENDO AIR/WATER/SUCTION AND BIOPSY VALVE

## (undated) DEVICE — TUBING, SUCTION, 1/4" X 20', STRAIGHT: Brand: MEDLINE INDUSTRIES, INC.

## (undated) DEVICE — GOWN,REINF,POLY,ECL,PP SLV,XL: Brand: MEDLINE

## (undated) DEVICE — FRCP BX RADJAW4 NDL 2.8 240CM LG OG BX40

## (undated) DEVICE — Device

## (undated) DEVICE — SUCTION CANISTER, 1500CC, RIGID: Brand: DEROYAL

## (undated) DEVICE — Device: Brand: ENDOGATOR

## (undated) DEVICE — SYR LUERLOK 30CC

## (undated) DEVICE — SINGLE PORT MANIFOLD: Brand: NEPTUNE 2